# Patient Record
Sex: MALE | Race: WHITE | Employment: FULL TIME | URBAN - METROPOLITAN AREA
[De-identification: names, ages, dates, MRNs, and addresses within clinical notes are randomized per-mention and may not be internally consistent; named-entity substitution may affect disease eponyms.]

---

## 2018-12-17 ENCOUNTER — HOSPITAL ENCOUNTER (OUTPATIENT)
Age: 23
Setting detail: SPECIMEN
Discharge: HOME | End: 2018-12-17
Payer: COMMERCIAL

## 2018-12-17 ENCOUNTER — OFFICE VISIT (OUTPATIENT)
Dept: DERMATOLOGY | Age: 23
End: 2018-12-17
Payer: COMMERCIAL

## 2018-12-17 VITALS
OXYGEN SATURATION: 98 % | DIASTOLIC BLOOD PRESSURE: 94 MMHG | WEIGHT: 178.2 LBS | SYSTOLIC BLOOD PRESSURE: 161 MMHG | BODY MASS INDEX: 22.16 KG/M2 | HEART RATE: 119 BPM | HEIGHT: 75 IN | TEMPERATURE: 97.9 F

## 2018-12-17 DIAGNOSIS — D48.5 NEOPLASM OF UNCERTAIN BEHAVIOR OF SKIN: Primary | ICD-10-CM

## 2018-12-17 DIAGNOSIS — L64.9 MALE PATTERN BALDNESS: ICD-10-CM

## 2018-12-17 PROCEDURE — G8427 DOCREV CUR MEDS BY ELIG CLIN: HCPCS | Performed by: DERMATOLOGY

## 2018-12-17 PROCEDURE — G8420 CALC BMI NORM PARAMETERS: HCPCS | Performed by: DERMATOLOGY

## 2018-12-17 PROCEDURE — 99202 OFFICE O/P NEW SF 15 MIN: CPT | Performed by: DERMATOLOGY

## 2018-12-17 PROCEDURE — G8484 FLU IMMUNIZE NO ADMIN: HCPCS | Performed by: DERMATOLOGY

## 2018-12-17 PROCEDURE — 1036F TOBACCO NON-USER: CPT | Performed by: DERMATOLOGY

## 2018-12-17 PROCEDURE — 11100 PR BIOPSY OF SKIN LESION: CPT | Performed by: DERMATOLOGY

## 2018-12-17 RX ORDER — LIDOCAINE HYDROCHLORIDE AND EPINEPHRINE 10; 10 MG/ML; UG/ML
0.5 INJECTION, SOLUTION INFILTRATION; PERINEURAL ONCE
Status: COMPLETED | OUTPATIENT
Start: 2018-12-17 | End: 2018-12-17

## 2018-12-17 RX ADMIN — LIDOCAINE HYDROCHLORIDE AND EPINEPHRINE 0.5 ML: 10; 10 INJECTION, SOLUTION INFILTRATION; PERINEURAL at 10:27

## 2018-12-17 NOTE — PROGRESS NOTES
Dermatology Patient Note  [facility] DERMATOLOGY  [address] [facility]  [address] [place] [address]  Dept: [phone]  Dept Fax: [phone]      VISITDATE: 12/17/2018   REFERRING PROVIDER: No ref. provider found      Aida Dawson is a 24 y.o. male  who presents today in the office for:    New Patient (hair loss/ mole on ear. noticed hair loss about six months ago)      HISTORY OF PRESENT ILLNESS:  Patient presents for mole  Location: right earlobe  Duration: as long as he can remember  Symptoms: none  Course: may be enlarging  Exacerbating factors: unsure  Prior treatments: none  Also complains of hair thinning, some family history of this    CURRENT MEDICATIONS:   No current outpatient prescriptions on file. Current Facility-Administered Medications   Medication Dose Route Frequency Provider Last Rate Last Dose    lidocaine-EPINEPHrine 1 percent-1:[id number] injection 0.5 mL  0.5 mL Intradermal Once Clarence Couch MD           ALLERGIES:   Allergies   Allergen Reactions    Penicillins Rash       SOCIAL HISTORY:  Social History   Substance Use Topics    Smoking status: Never Smoker    Smokeless tobacco: Never Used    Alcohol use No       REVIEW OF SYSTEMS:  Review of Systems  Skin: Denies any new changing, growing orbleeding lesions or rashes except as described in the HPI   Constitutional: Denies fevers, chills, and malaise. PHYSICAL EXAM:   BP (!) 161/94   Pulse 119   Temp 97.9 °F (36.6 °C) (Oral)   Ht 6' 3\" (1.905 m)   Wt 178 lb 3.2 oz (80.8 kg)   SpO2 98%   BMI 22.27 kg/m²     General Exam:  General Appearance: No acute distress, Well nourished     Neuro: Alert and oriented to person, place and time  Psych: Normal affect   Lymph Node: Not performed    Cutaneous Exam: Performed as documented in clinic note below. Sun-exposed skin, which includes the head/face, neck, both arms, digits and/or nails was examined.     Pertinent Physical Exam Findings:  Physical Exam  Right earlobe

## 2018-12-21 LAB — DERMATOLOGY PATHOLOGY REPORT: NORMAL

## 2019-09-18 ENCOUNTER — OFFICE VISIT (OUTPATIENT)
Dept: FAMILY MEDICINE CLINIC | Age: 24
End: 2019-09-18
Payer: COMMERCIAL

## 2019-09-18 VITALS
RESPIRATION RATE: 16 BRPM | WEIGHT: 177 LBS | HEIGHT: 72 IN | SYSTOLIC BLOOD PRESSURE: 122 MMHG | BODY MASS INDEX: 23.98 KG/M2 | DIASTOLIC BLOOD PRESSURE: 84 MMHG | OXYGEN SATURATION: 98 % | TEMPERATURE: 97.6 F | HEART RATE: 98 BPM

## 2019-09-18 DIAGNOSIS — R19.09 UMBILICAL MASS: Primary | ICD-10-CM

## 2019-09-18 DIAGNOSIS — Z00.00 PREVENTATIVE HEALTH CARE: ICD-10-CM

## 2019-09-18 DIAGNOSIS — R59.0 LYMPHADENOPATHY OF RIGHT CERVICAL REGION: ICD-10-CM

## 2019-09-18 PROCEDURE — G8420 CALC BMI NORM PARAMETERS: HCPCS | Performed by: NURSE PRACTITIONER

## 2019-09-18 PROCEDURE — 1036F TOBACCO NON-USER: CPT | Performed by: NURSE PRACTITIONER

## 2019-09-18 PROCEDURE — G8427 DOCREV CUR MEDS BY ELIG CLIN: HCPCS | Performed by: NURSE PRACTITIONER

## 2019-09-18 PROCEDURE — 99213 OFFICE O/P EST LOW 20 MIN: CPT | Performed by: NURSE PRACTITIONER

## 2019-09-18 RX ORDER — LORATADINE 10 MG/1
10 TABLET ORAL DAILY PRN
COMMUNITY

## 2019-09-18 ASSESSMENT — ENCOUNTER SYMPTOMS
CONSTIPATION: 0
DIARRHEA: 0
NAUSEA: 0
ABDOMINAL PAIN: 1
VOMITING: 0

## 2019-09-18 ASSESSMENT — PATIENT HEALTH QUESTIONNAIRE - PHQ9
2. FEELING DOWN, DEPRESSED OR HOPELESS: 0
1. LITTLE INTEREST OR PLEASURE IN DOING THINGS: 0
SUM OF ALL RESPONSES TO PHQ9 QUESTIONS 1 & 2: 0
SUM OF ALL RESPONSES TO PHQ QUESTIONS 1-9: 0
SUM OF ALL RESPONSES TO PHQ QUESTIONS 1-9: 0

## 2019-09-18 NOTE — PROGRESS NOTES
Discontinued     Subjective:   Review of Systems   Constitutional: Negative for chills, fatigue and fever. HENT: Negative. Gastrointestinal: Positive for abdominal pain. Negative for constipation, diarrhea, nausea and vomiting. Allergic/Immunologic: Positive for environmental allergies. Hematological: Positive for adenopathy (Chronic to right neck- states unchanged since 2016). Does not bruise/bleed easily. Objective:   /84   Pulse 98   Temp 97.6 °F (36.4 °C)   Resp 16   Ht 5' 11.65\" (1.82 m)   Wt 177 lb (80.3 kg)   SpO2 98%   BMI 24.24 kg/m²   Physical Exam   Constitutional: He is oriented to person, place, and time. He appears well-developed and well-nourished. Non-toxic appearance. He does not have a sickly appearance. HENT:   Head: Normocephalic and atraumatic. Right Ear: External ear normal.   Left Ear: External ear normal.   Eyes: Conjunctivae are normal.   Neck: Normal range of motion. Cardiovascular: Normal rate, regular rhythm and normal heart sounds. Exam reveals no gallop and no friction rub. No murmur heard. Pulmonary/Chest: Effort normal and breath sounds normal. No accessory muscle usage. No respiratory distress. He has no decreased breath sounds. He has no wheezes. He has no rhonchi. He has no rales. Abdominal: Soft. Normal appearance and bowel sounds are normal. There is no tenderness. There is no rigidity, no rebound and no guarding. A hernia (Possible small mass to umbilical region, soft, no warmth, no erythema) is present. Musculoskeletal: Normal range of motion. Lymphadenopathy:        Head (right side): No submental, no submandibular, no tonsillar, no preauricular, no posterior auricular and no occipital adenopathy present. Head (left side): No submental, no submandibular, no tonsillar, no preauricular, no posterior auricular and no occipital adenopathy present. He has cervical adenopathy.         Right cervical: Deep cervical (Possible

## 2019-10-02 ENCOUNTER — HOSPITAL ENCOUNTER (OUTPATIENT)
Dept: ULTRASOUND IMAGING | Age: 24
Discharge: HOME | End: 2019-10-04
Payer: COMMERCIAL

## 2019-10-02 ENCOUNTER — HOSPITAL ENCOUNTER (OUTPATIENT)
Dept: CT IMAGING | Age: 24
Discharge: HOME | End: 2019-10-04
Payer: COMMERCIAL

## 2019-10-02 DIAGNOSIS — R59.0 LYMPHADENOPATHY OF RIGHT CERVICAL REGION: Primary | ICD-10-CM

## 2019-10-02 DIAGNOSIS — R59.0 LYMPHADENOPATHY OF RIGHT CERVICAL REGION: ICD-10-CM

## 2019-10-02 DIAGNOSIS — R19.09 UMBILICAL MASS: ICD-10-CM

## 2019-10-02 PROCEDURE — 2580000003 HC RX 258: Performed by: NURSE PRACTITIONER

## 2019-10-02 PROCEDURE — 6360000004 HC RX CONTRAST MEDICATION: Performed by: NURSE PRACTITIONER

## 2019-10-02 PROCEDURE — 74160 CT ABDOMEN W/CONTRAST: CPT

## 2019-10-02 PROCEDURE — 76536 US EXAM OF HEAD AND NECK: CPT

## 2019-10-02 RX ORDER — AZITHROMYCIN 250 MG/1
TABLET, FILM COATED ORAL
Qty: 1 PACKET | Refills: 0 | Status: SHIPPED | OUTPATIENT
Start: 2019-10-02 | End: 2019-10-12

## 2019-10-02 RX ORDER — SODIUM CHLORIDE 0.9 % (FLUSH) 0.9 %
10 SYRINGE (ML) INJECTION PRN
Status: DISCONTINUED | OUTPATIENT
Start: 2019-10-02 | End: 2019-10-05 | Stop reason: HOSPADM

## 2019-10-02 RX ORDER — 0.9 % SODIUM CHLORIDE 0.9 %
80 INTRAVENOUS SOLUTION INTRAVENOUS ONCE
Status: COMPLETED | OUTPATIENT
Start: 2019-10-02 | End: 2019-10-02

## 2019-10-02 RX ADMIN — Medication 10 ML: at 10:04

## 2019-10-02 RX ADMIN — IOVERSOL 75 ML: 741 INJECTION INTRA-ARTERIAL; INTRAVENOUS at 10:03

## 2019-10-02 RX ADMIN — SODIUM CHLORIDE 80 ML: 9 INJECTION, SOLUTION INTRAVENOUS at 10:03

## 2024-03-18 SDOH — HEALTH STABILITY: PHYSICAL HEALTH: ON AVERAGE, HOW MANY MINUTES DO YOU ENGAGE IN EXERCISE AT THIS LEVEL?: 30 MIN

## 2024-03-18 SDOH — HEALTH STABILITY: PHYSICAL HEALTH: ON AVERAGE, HOW MANY DAYS PER WEEK DO YOU ENGAGE IN MODERATE TO STRENUOUS EXERCISE (LIKE A BRISK WALK)?: 3 DAYS

## 2024-03-20 ENCOUNTER — HOSPITAL ENCOUNTER (OUTPATIENT)
Age: 29
Setting detail: SPECIMEN
Discharge: HOME | End: 2024-03-20

## 2024-03-20 ENCOUNTER — OFFICE VISIT (OUTPATIENT)
Dept: PRIMARY CARE CLINIC | Age: 29
End: 2024-03-20
Payer: COMMERCIAL

## 2024-03-20 VITALS
HEART RATE: 106 BPM | HEIGHT: 71 IN | DIASTOLIC BLOOD PRESSURE: 86 MMHG | BODY MASS INDEX: 27.38 KG/M2 | RESPIRATION RATE: 16 BRPM | WEIGHT: 195.6 LBS | OXYGEN SATURATION: 98 % | SYSTOLIC BLOOD PRESSURE: 130 MMHG

## 2024-03-20 DIAGNOSIS — R09.82 PND (POST-NASAL DRIP): ICD-10-CM

## 2024-03-20 DIAGNOSIS — Z13.6 ENCOUNTER FOR LIPID SCREENING FOR CARDIOVASCULAR DISEASE: ICD-10-CM

## 2024-03-20 DIAGNOSIS — R59.0 LYMPHADENOPATHY OF LEFT CERVICAL REGION: ICD-10-CM

## 2024-03-20 DIAGNOSIS — T78.40XD ALLERGIC REACTION, SUBSEQUENT ENCOUNTER: ICD-10-CM

## 2024-03-20 DIAGNOSIS — R09.82 PND (POST-NASAL DRIP): Primary | ICD-10-CM

## 2024-03-20 DIAGNOSIS — J35.1 ENLARGED TONSILS: ICD-10-CM

## 2024-03-20 DIAGNOSIS — Z13.220 ENCOUNTER FOR LIPID SCREENING FOR CARDIOVASCULAR DISEASE: ICD-10-CM

## 2024-03-20 LAB
ALBUMIN SERPL-MCNC: 4.6 G/DL (ref 3.5–5.2)
ALBUMIN/GLOB SERPL: 2 {RATIO} (ref 1–2.5)
ALP SERPL-CCNC: 69 U/L (ref 40–129)
ALT SERPL-CCNC: 27 U/L (ref 10–50)
ANION GAP SERPL CALCULATED.3IONS-SCNC: 10 MMOL/L (ref 9–16)
AST SERPL-CCNC: 31 U/L (ref 10–50)
BASOPHILS # BLD: 0 K/UL (ref 0–0.2)
BASOPHILS NFR BLD: 0 % (ref 0–2)
BILIRUB SERPL-MCNC: 0.6 MG/DL (ref 0–1.2)
BUN SERPL-MCNC: 12 MG/DL (ref 6–20)
CALCIUM SERPL-MCNC: 9.1 MG/DL (ref 8.6–10.4)
CHLORIDE SERPL-SCNC: 102 MMOL/L (ref 98–107)
CHOLEST SERPL-MCNC: 133 MG/DL (ref 0–199)
CHOLESTEROL/HDL RATIO: 4
CO2 SERPL-SCNC: 27 MMOL/L (ref 20–31)
CREAT SERPL-MCNC: 1 MG/DL (ref 0.7–1.2)
EOSINOPHIL # BLD: 0.28 K/UL (ref 0–0.4)
EOSINOPHILS RELATIVE PERCENT: 4 % (ref 1–4)
ERYTHROCYTE [DISTWIDTH] IN BLOOD BY AUTOMATED COUNT: 13.4 % (ref 11.8–14.4)
GFR SERPL CREATININE-BSD FRML MDRD: >60 ML/MIN/1.73M2
GLUCOSE P FAST SERPL-MCNC: 96 MG/DL (ref 74–99)
HCT VFR BLD AUTO: 48.8 % (ref 40.7–50.3)
HDLC SERPL-MCNC: 37 MG/DL
HGB BLD-MCNC: 16.2 G/DL (ref 13–17)
IMM GRANULOCYTES # BLD AUTO: 0 K/UL (ref 0–0.3)
IMM GRANULOCYTES NFR BLD: 0 %
LDLC SERPL CALC-MCNC: 78 MG/DL (ref 0–100)
LYMPHOCYTES NFR BLD: 2.24 K/UL (ref 1–4.8)
LYMPHOCYTES RELATIVE PERCENT: 32 % (ref 24–44)
MCH RBC QN AUTO: 30.3 PG (ref 25.2–33.5)
MCHC RBC AUTO-ENTMCNC: 33.2 G/DL (ref 28.4–34.8)
MCV RBC AUTO: 91.4 FL (ref 82.6–102.9)
MONOCYTES NFR BLD: 0.14 K/UL (ref 0.1–0.8)
MONOCYTES NFR BLD: 2 % (ref 1–7)
MORPHOLOGY: NORMAL
NEUTROPHILS NFR BLD: 62 % (ref 36–66)
NEUTS SEG NFR BLD: 4.34 K/UL (ref 1.8–7.7)
NRBC BLD-RTO: 0 PER 100 WBC
PLATELET # BLD AUTO: 181 K/UL (ref 138–453)
PMV BLD AUTO: 10.7 FL (ref 8.1–13.5)
POTASSIUM SERPL-SCNC: 4.7 MMOL/L (ref 3.7–5.3)
PROT SERPL-MCNC: 7.2 G/DL (ref 6.6–8.7)
RBC # BLD AUTO: 5.34 M/UL (ref 4.21–5.77)
SODIUM SERPL-SCNC: 139 MMOL/L (ref 136–145)
TRIGL SERPL-MCNC: 88 MG/DL
TSH SERPL DL<=0.05 MIU/L-ACNC: 2.34 UIU/ML (ref 0.27–4.2)
VLDLC SERPL CALC-MCNC: 18 MG/DL
WBC OTHER # BLD: 7 K/UL (ref 3.5–11.3)

## 2024-03-20 PROCEDURE — 99204 OFFICE O/P NEW MOD 45 MIN: CPT | Performed by: PHYSICIAN ASSISTANT

## 2024-03-20 RX ORDER — CETIRIZINE HYDROCHLORIDE 10 MG/1
10 TABLET ORAL PRN
COMMUNITY

## 2024-03-20 RX ORDER — FLUTICASONE PROPIONATE 50 MCG
1 SPRAY, SUSPENSION (ML) NASAL PRN
COMMUNITY

## 2024-03-20 SDOH — ECONOMIC STABILITY: FOOD INSECURITY: WITHIN THE PAST 12 MONTHS, YOU WORRIED THAT YOUR FOOD WOULD RUN OUT BEFORE YOU GOT MONEY TO BUY MORE.: NEVER TRUE

## 2024-03-20 SDOH — ECONOMIC STABILITY: HOUSING INSECURITY
IN THE LAST 12 MONTHS, WAS THERE A TIME WHEN YOU DID NOT HAVE A STEADY PLACE TO SLEEP OR SLEPT IN A SHELTER (INCLUDING NOW)?: NO

## 2024-03-20 SDOH — ECONOMIC STABILITY: FOOD INSECURITY: WITHIN THE PAST 12 MONTHS, THE FOOD YOU BOUGHT JUST DIDN'T LAST AND YOU DIDN'T HAVE MONEY TO GET MORE.: NEVER TRUE

## 2024-03-20 SDOH — ECONOMIC STABILITY: INCOME INSECURITY: HOW HARD IS IT FOR YOU TO PAY FOR THE VERY BASICS LIKE FOOD, HOUSING, MEDICAL CARE, AND HEATING?: NOT HARD AT ALL

## 2024-03-20 ASSESSMENT — PATIENT HEALTH QUESTIONNAIRE - PHQ9
SUM OF ALL RESPONSES TO PHQ QUESTIONS 1-9: 0
2. FEELING DOWN, DEPRESSED OR HOPELESS: NOT AT ALL
SUM OF ALL RESPONSES TO PHQ QUESTIONS 1-9: 0
SUM OF ALL RESPONSES TO PHQ QUESTIONS 1-9: 0
SUM OF ALL RESPONSES TO PHQ9 QUESTIONS 1 & 2: 0
1. LITTLE INTEREST OR PLEASURE IN DOING THINGS: NOT AT ALL
SUM OF ALL RESPONSES TO PHQ QUESTIONS 1-9: 0

## 2024-03-20 ASSESSMENT — ENCOUNTER SYMPTOMS
SHORTNESS OF BREATH: 0
CONSTIPATION: 0
NAUSEA: 0
COUGH: 0
ABDOMINAL PAIN: 0
SINUS PAIN: 0
VOMITING: 0
EYE PAIN: 0
SORE THROAT: 0
DIARRHEA: 0
TROUBLE SWALLOWING: 0

## 2024-03-20 NOTE — PROGRESS NOTES
[facility]  [facility] PRIMARY CARE  [facility] [facility] [address]  Dept: [phone]  Dept Fax: [phone]    Dom Pepe is a 28 y.o. male who presents today for his medical conditions/complaints as noted below.    Chief Complaint   Patient presents with    hospitals Care     Woke up Monday morning with swollen lymph nodes on left side of neck that were painful and made it hard to move head, has somewhat improved, also noticing increased PND on right side       HPI:     Patient presents to the office to establish care.  No recent PCP.  He has past medical history including allergies.    Primary concern is new onset drainage, allergies, lymph node swelling.  He reports that he recently moved back to his home in [place] and since he has moved back he has had significant allergic reaction with runny nose, drainage, lymphadenopathy.  This is happened to him in the past.  He did start up some Zyrtec which has helped tremendously.  Patient has never had any workup with ENT or allergist.  This is been an ongoing issue for several years.  He has recurring drainage, sinus congestion, pressure.  He does admit to left-sided lymph node swelling which is gradually improving since the beginning the week.  Denies any difficulty swallowing.  Denies acute sore throat.  No change in voice.  No recent sick exposure.  No fevers or chills.  No history of allergies.  No respiratory concerns.  He does admit to nightly snoring.    Otherwise, patient reports he is typically healthy.  Denies any lightheadedness, dizziness, shortness of breath, chest pain, leg edema, syncope.    Patient did report coffee intake this morning.  He typically does have elevated heart rate.  Blood pressure stable.  Weight slightly increased from 5 years ago.          No results found for: \"LABA1C\"          ( goal A1C is < 7)   No components found for: \"LABMICR\"  No results found for: \"LDLCHOLESTEROL\", \"LDLCALC\"    (goal LDL is

## 2024-03-23 LAB
A ALTERNATA IGE QN: <0.1 KU/L (ref 0–0.34)
A FUMIGATUS IGE QN: <0.1 KU/L (ref 0–0.34)
ALLERGEN BIRCH IGE: 0.28 KU/L (ref 0–0.34)
BERMUDA GRASS IGE QN: 0.19 KU/L (ref 0–0.34)
BOXELDER IGE QN: 0.34 KU/L (ref 0–0.34)
C HERBARUM IGE QN: <0.1 KUL/L (ref 0–0.34)
CALIF WALNUT POLN IGE QN: 0.49 KU/L (ref 0–0.34)
CAT DANDER IGE QN: <0.1 KU/L (ref 0–0.34)
CMN PIGWEED IGE QN: 0.19 KU/L (ref 0–0.34)
COMMON RAGWEED IGE QN: 12.4 KU/L (ref 0–0.34)
COTTONWOOD IGE QN: 0.73 KU/L (ref 0–0.34)
D FARINAE IGE QN: <0.1 KU/L (ref 0–0.34)
D PTERONYSS IGE QN: 0.11 KU/L (ref 0–0.34)
DOG DANDER IGE QN: <0.1 KU/L (ref 0–0.34)
IGE SERPL-ACNC: 90 IU/ML (ref 0–100)
LONDON PLANE IGE QN: 0.22 KU/L (ref 0–0.34)
M RACEMOSUS IGE QN: <0.1 KU/L (ref 0–0.34)
MOUSE EPITH IGE QN: <0.1 KU/L (ref 0–0.34)
MT JUNIPER IGE QN: 0.11 KU/L (ref 0–0.34)
P NOTATUM IGE QN: <0.1 KU/L (ref 0–0.34)
PECAN/HICK TREE IGE QN: 0.22 KU/L (ref 0–0.34)
ROACH IGE QN: 0.1 KU/L (ref 0–0.34)
SALTWORT IGE QN: 0.15 KU/L (ref 0–0.34)
SHEEP SORREL IGE QN: 0.35 KU/L (ref 0–0.34)
TIMOTHY IGE QN: <0.1 KU/L (ref 0–0.34)
WHITE ASH IGE QN: 0.44 KU/L (ref 0–0.34)
WHITE ELM IGE QN: 0.36 KU/L (ref 0–0.34)
WHITE MULBERRY IGE QN: <0.1 KU/L (ref 0–0.34)
WHITE OAK IGE QN: 0.22 KU/L (ref 0–0.34)

## 2024-04-01 ENCOUNTER — PATIENT MESSAGE (OUTPATIENT)
Dept: PRIMARY CARE CLINIC | Age: 29
End: 2024-04-01

## 2024-04-01 DIAGNOSIS — R59.0 LYMPHADENOPATHY OF LEFT CERVICAL REGION: Primary | ICD-10-CM

## 2024-04-01 NOTE — TELEPHONE ENCOUNTER
From: Dom Pepe  To: Maxwell Gonzalez  Sent: 4/1/2024 11:06 AM EDT  Subject: Swollen lymph nodes still present     Good morning,     It’s been two weeks and the swollen lymph nodes are still here. Should I make an appointment for these week to follow up?